# Patient Record
Sex: FEMALE | Race: BLACK OR AFRICAN AMERICAN | NOT HISPANIC OR LATINO | Employment: FULL TIME | URBAN - METROPOLITAN AREA
[De-identification: names, ages, dates, MRNs, and addresses within clinical notes are randomized per-mention and may not be internally consistent; named-entity substitution may affect disease eponyms.]

---

## 2017-02-10 ENCOUNTER — HOSPITAL ENCOUNTER (OUTPATIENT)
Dept: INFUSION THERAPY | Facility: HOSPITAL | Age: 27
Discharge: HOME | End: 2017-02-10
Admitting: INTERNAL MEDICINE

## 2017-02-10 VITALS
TEMPERATURE: 98.7 F | HEART RATE: 97 BPM | RESPIRATION RATE: 16 BRPM | OXYGEN SATURATION: 99 % | DIASTOLIC BLOOD PRESSURE: 90 MMHG | SYSTOLIC BLOOD PRESSURE: 137 MMHG

## 2017-02-10 DIAGNOSIS — K50.00 CROHN'S DISEASE OF SMALL INTESTINE WITHOUT COMPLICATION (HCC): ICD-10-CM

## 2017-02-10 PROCEDURE — 25010000002 VEDOLIZUMAB 300 MG RECONSTITUTED SOLUTION 300 MG VIAL: Performed by: INTERNAL MEDICINE

## 2017-02-10 PROCEDURE — 96365 THER/PROPH/DIAG IV INF INIT: CPT

## 2017-02-10 PROCEDURE — 96413 CHEMO IV INFUSION 1 HR: CPT

## 2017-02-10 RX ADMIN — VEDOLIZUMAB 300 MG: 300 INJECTION, POWDER, LYOPHILIZED, FOR SOLUTION INTRAVENOUS at 12:06

## 2017-02-10 NOTE — PROGRESS NOTES
TOLERATED TREATMENT WELL. VSS. PO FLUIDS AND LUNCH GIVEN, STATES HAS HAD POOR APPETITE. HELD X 30 MINUTES POST INFUSION OBSERVATION. DC'D HOME AMB

## 2017-04-07 ENCOUNTER — HOSPITAL ENCOUNTER (OUTPATIENT)
Dept: INFUSION THERAPY | Facility: HOSPITAL | Age: 27
Discharge: HOME | End: 2017-04-07
Admitting: INTERNAL MEDICINE

## 2017-04-07 VITALS
DIASTOLIC BLOOD PRESSURE: 87 MMHG | RESPIRATION RATE: 20 BRPM | SYSTOLIC BLOOD PRESSURE: 120 MMHG | OXYGEN SATURATION: 98 % | WEIGHT: 140 LBS | BODY MASS INDEX: 26.45 KG/M2 | TEMPERATURE: 98.8 F | HEART RATE: 91 BPM

## 2017-04-07 DIAGNOSIS — K50.019 CROHN'S DISEASE OF SMALL INTESTINE WITH COMPLICATION (HCC): ICD-10-CM

## 2017-04-07 PROCEDURE — 96365 THER/PROPH/DIAG IV INF INIT: CPT

## 2017-04-07 PROCEDURE — 96413 CHEMO IV INFUSION 1 HR: CPT

## 2017-04-07 PROCEDURE — 25010000002 VEDOLIZUMAB 300 MG RECONSTITUTED SOLUTION 300 MG VIAL: Performed by: FAMILY MEDICINE

## 2017-04-07 RX ADMIN — VEDOLIZUMAB 300 MG: 300 INJECTION, POWDER, LYOPHILIZED, FOR SOLUTION INTRAVENOUS at 11:39

## 2017-06-02 ENCOUNTER — HOSPITAL ENCOUNTER (OUTPATIENT)
Dept: INFUSION THERAPY | Facility: HOSPITAL | Age: 27
Discharge: HOME | End: 2017-06-02
Admitting: INTERNAL MEDICINE

## 2017-06-02 VITALS
OXYGEN SATURATION: 95 % | RESPIRATION RATE: 16 BRPM | BODY MASS INDEX: 27.38 KG/M2 | SYSTOLIC BLOOD PRESSURE: 120 MMHG | TEMPERATURE: 99 F | DIASTOLIC BLOOD PRESSURE: 77 MMHG | HEIGHT: 61 IN | HEART RATE: 94 BPM | WEIGHT: 145 LBS

## 2017-06-02 DIAGNOSIS — K50.019 CROHN'S DISEASE OF SMALL INTESTINE WITH COMPLICATION (HCC): ICD-10-CM

## 2017-06-02 PROCEDURE — 96365 THER/PROPH/DIAG IV INF INIT: CPT

## 2017-06-02 PROCEDURE — 96413 CHEMO IV INFUSION 1 HR: CPT

## 2017-06-02 PROCEDURE — 25010000002 VEDOLIZUMAB 300 MG RECONSTITUTED SOLUTION 300 MG VIAL: Performed by: FAMILY MEDICINE

## 2017-06-02 RX ADMIN — VEDOLIZUMAB 300 MG: 300 INJECTION, POWDER, LYOPHILIZED, FOR SOLUTION INTRAVENOUS at 11:53

## 2017-06-02 NOTE — PROGRESS NOTES
Tolerated infusion well with no complaints. No S/S reaction noted. Patient ambulatory, D/C'd from [facility]U at 1328.

## 2017-07-28 ENCOUNTER — HOSPITAL ENCOUNTER (OUTPATIENT)
Dept: INFUSION THERAPY | Facility: HOSPITAL | Age: 27
Discharge: HOME | End: 2017-07-28
Admitting: INTERNAL MEDICINE

## 2017-07-28 VITALS
SYSTOLIC BLOOD PRESSURE: 133 MMHG | HEART RATE: 96 BPM | DIASTOLIC BLOOD PRESSURE: 75 MMHG | TEMPERATURE: 98.8 F | RESPIRATION RATE: 16 BRPM | OXYGEN SATURATION: 99 %

## 2017-07-28 DIAGNOSIS — K50.019 CROHN'S DISEASE OF SMALL INTESTINE WITH COMPLICATION (HCC): ICD-10-CM

## 2017-07-28 PROCEDURE — 96365 THER/PROPH/DIAG IV INF INIT: CPT

## 2017-07-28 PROCEDURE — 25010000002 VEDOLIZUMAB 300 MG RECONSTITUTED SOLUTION 300 MG VIAL: Performed by: FAMILY MEDICINE

## 2017-07-28 RX ADMIN — VEDOLIZUMAB 300 MG: 300 INJECTION, POWDER, LYOPHILIZED, FOR SOLUTION INTRAVENOUS at 12:05

## 2017-09-22 ENCOUNTER — HOSPITAL ENCOUNTER (OUTPATIENT)
Dept: INFUSION THERAPY | Facility: HOSPITAL | Age: 27
Discharge: HOME | End: 2017-09-22
Admitting: NURSE PRACTITIONER

## 2017-09-22 VITALS
WEIGHT: 153 LBS | BODY MASS INDEX: 28.89 KG/M2 | DIASTOLIC BLOOD PRESSURE: 74 MMHG | SYSTOLIC BLOOD PRESSURE: 121 MMHG | HEIGHT: 61 IN | RESPIRATION RATE: 16 BRPM | HEART RATE: 90 BPM | TEMPERATURE: 98.8 F | OXYGEN SATURATION: 99 %

## 2017-09-22 DIAGNOSIS — K50.019 CROHN'S DISEASE OF SMALL INTESTINE WITH COMPLICATION (HCC): ICD-10-CM

## 2017-09-22 PROCEDURE — 96365 THER/PROPH/DIAG IV INF INIT: CPT

## 2017-09-22 PROCEDURE — 25010000002 VEDOLIZUMAB 300 MG RECONSTITUTED SOLUTION 300 MG VIAL: Performed by: FAMILY MEDICINE

## 2017-09-22 RX ADMIN — VEDOLIZUMAB 300 MG: 300 INJECTION, POWDER, LYOPHILIZED, FOR SOLUTION INTRAVENOUS at 11:42

## 2017-09-22 NOTE — PROGRESS NOTES
Tolerated infusion with no problems or S/S of reaction.  Monitored for 30 minutes post infusion.  Pt D/C per ambulation @ 1248.

## 2017-11-17 ENCOUNTER — HOSPITAL ENCOUNTER (OUTPATIENT)
Dept: INFUSION THERAPY | Facility: HOSPITAL | Age: 27
Discharge: HOME | End: 2017-11-17
Admitting: NURSE PRACTITIONER

## 2017-11-17 VITALS
TEMPERATURE: 98.1 F | RESPIRATION RATE: 16 BRPM | OXYGEN SATURATION: 99 % | SYSTOLIC BLOOD PRESSURE: 121 MMHG | HEART RATE: 87 BPM | DIASTOLIC BLOOD PRESSURE: 87 MMHG

## 2017-11-17 DIAGNOSIS — K50.019 CROHN'S DISEASE OF SMALL INTESTINE WITH COMPLICATION (HCC): ICD-10-CM

## 2017-11-17 PROCEDURE — 25010000002 VEDOLIZUMAB 300 MG RECONSTITUTED SOLUTION 300 MG VIAL: Performed by: FAMILY MEDICINE

## 2017-11-17 PROCEDURE — 96365 THER/PROPH/DIAG IV INF INIT: CPT

## 2017-11-17 RX ADMIN — VEDOLIZUMAB 300 MG: 300 INJECTION, POWDER, LYOPHILIZED, FOR SOLUTION INTRAVENOUS at 12:12

## 2018-01-12 ENCOUNTER — HOSPITAL ENCOUNTER (OUTPATIENT)
Dept: INFUSION THERAPY | Facility: HOSPITAL | Age: 28
Discharge: HOME | End: 2018-01-12
Admitting: NURSE PRACTITIONER

## 2018-01-12 VITALS
DIASTOLIC BLOOD PRESSURE: 88 MMHG | HEART RATE: 101 BPM | WEIGHT: 160 LBS | TEMPERATURE: 98.7 F | SYSTOLIC BLOOD PRESSURE: 130 MMHG | BODY MASS INDEX: 30.23 KG/M2 | OXYGEN SATURATION: 97 % | RESPIRATION RATE: 16 BRPM

## 2018-01-12 DIAGNOSIS — K50.019 CROHN'S DISEASE OF SMALL INTESTINE WITH COMPLICATION (HCC): ICD-10-CM

## 2018-01-12 PROCEDURE — 96365 THER/PROPH/DIAG IV INF INIT: CPT

## 2018-01-12 PROCEDURE — 25010000002 VEDOLIZUMAB 300 MG RECONSTITUTED SOLUTION 300 MG VIAL: Performed by: FAMILY MEDICINE

## 2018-01-12 RX ORDER — LEVETIRACETAM 250 MG/1
250 TABLET ORAL 3 TIMES DAILY PRN
COMMUNITY
End: 2019-04-12

## 2018-01-12 RX ADMIN — VEDOLIZUMAB 300 MG: 300 INJECTION, POWDER, LYOPHILIZED, FOR SOLUTION INTRAVENOUS at 11:51

## 2018-01-12 NOTE — PROGRESS NOTES
Tolerated infusion with no problems or S/S of reaction.  Monitored for 30 minutes post infusion.  Pt D/C per ambulation @ 1305.

## 2018-03-08 ENCOUNTER — HOSPITAL ENCOUNTER (OUTPATIENT)
Dept: INFUSION THERAPY | Facility: HOSPITAL | Age: 28
Discharge: HOME | End: 2018-03-08
Admitting: NURSE PRACTITIONER

## 2018-03-08 VITALS
BODY MASS INDEX: 30.23 KG/M2 | OXYGEN SATURATION: 100 % | HEART RATE: 114 BPM | RESPIRATION RATE: 16 BRPM | DIASTOLIC BLOOD PRESSURE: 80 MMHG | WEIGHT: 160 LBS | TEMPERATURE: 99.6 F | SYSTOLIC BLOOD PRESSURE: 120 MMHG

## 2018-03-08 DIAGNOSIS — K50.019 CROHN'S DISEASE OF SMALL INTESTINE WITH COMPLICATION (HCC): ICD-10-CM

## 2018-03-08 LAB
ALBUMIN SERPL-MCNC: 4 G/DL (ref 3.5–5.2)
ALBUMIN/GLOB SERPL: 1.2 G/DL
ALP SERPL-CCNC: 73 U/L (ref 39–117)
ALT SERPL W P-5'-P-CCNC: 15 U/L (ref 1–33)
ANION GAP SERPL CALCULATED.3IONS-SCNC: 10.4 MMOL/L
AST SERPL-CCNC: 13 U/L (ref 1–32)
BILIRUB SERPL-MCNC: 0.3 MG/DL (ref 0.1–1.2)
BUN BLD-MCNC: 8 MG/DL (ref 6–20)
BUN/CREAT SERPL: 11 (ref 7–25)
CALCIUM SPEC-SCNC: 9.3 MG/DL (ref 8.6–10.5)
CHLORIDE SERPL-SCNC: 104 MMOL/L (ref 98–107)
CO2 SERPL-SCNC: 27.6 MMOL/L (ref 22–29)
CREAT BLD-MCNC: 0.73 MG/DL (ref 0.57–1)
DEPRECATED RDW RBC AUTO: 48.7 FL (ref 37–54)
ERYTHROCYTE [DISTWIDTH] IN BLOOD BY AUTOMATED COUNT: 13.7 % (ref 11.7–13)
GFR SERPL CREATININE-BSD FRML MDRD: 114 ML/MIN/1.73
GLOBULIN UR ELPH-MCNC: 3.3 GM/DL
GLUCOSE BLD-MCNC: 78 MG/DL (ref 65–99)
HCT VFR BLD AUTO: 40.6 % (ref 35.6–45.5)
HGB BLD-MCNC: 13.6 G/DL (ref 11.9–15.5)
MCH RBC QN AUTO: 32.7 PG (ref 26.9–32)
MCHC RBC AUTO-ENTMCNC: 33.5 G/DL (ref 32.4–36.3)
MCV RBC AUTO: 97.6 FL (ref 80.5–98.2)
PLATELET # BLD AUTO: 347 10*3/MM3 (ref 140–500)
PMV BLD AUTO: 9.3 FL (ref 6–12)
POTASSIUM BLD-SCNC: 4.4 MMOL/L (ref 3.5–5.2)
PROT SERPL-MCNC: 7.3 G/DL (ref 6–8.5)
RBC # BLD AUTO: 4.16 10*6/MM3 (ref 3.9–5.2)
SODIUM BLD-SCNC: 142 MMOL/L (ref 136–145)
WBC NRBC COR # BLD: 12.5 10*3/MM3 (ref 4.5–10.7)

## 2018-03-08 PROCEDURE — G0463 HOSPITAL OUTPT CLINIC VISIT: HCPCS

## 2018-03-08 PROCEDURE — 36415 COLL VENOUS BLD VENIPUNCTURE: CPT

## 2018-03-08 PROCEDURE — 85027 COMPLETE CBC AUTOMATED: CPT | Performed by: NURSE PRACTITIONER

## 2018-03-08 PROCEDURE — 80053 COMPREHEN METABOLIC PANEL: CPT | Performed by: NURSE PRACTITIONER

## 2018-03-08 RX ORDER — CLONAZEPAM 0.5 MG/1
0.5 TABLET ORAL 2 TIMES DAILY PRN
COMMUNITY
End: 2019-04-12

## 2018-03-08 NOTE — PROGRESS NOTES
Fellow RN started patients IV and vlad patients labs due to present RN busy with others. Upon current RN entering room and investigating health status, patient informed RN that she has not been feeling well but father recently passed away with  scheduled for this weekend. Patient states thinks stress is responsible for health status. However, chart further reviewed and patient with low grade fever, congestion and N/V. States no vomitting last night but positive vomiting 2 nights ago, vomiting only at night time per patient. RN called Dr. El's office, spoke with medical assistant Srinivas who spoke with MD.  Dr. El wants Entyv re-scheduled once patient fever-free x 48 hours. Patient ambulatory, D/C'd from [facility]U at 1131.

## 2018-03-08 NOTE — PATIENT INSTRUCTIONS
Patient to call back tomorrow to re-schedule appointment for Entyvio infusion after she receives her work schedule. Entyvio needs to be re-scheduled once fever-free for 48 hours per Dr. El.

## 2018-03-09 ENCOUNTER — HOSPITAL ENCOUNTER (OUTPATIENT)
Dept: INFUSION THERAPY | Facility: HOSPITAL | Age: 28
Discharge: HOME | End: 2018-03-09

## 2018-03-14 ENCOUNTER — HOSPITAL ENCOUNTER (OUTPATIENT)
Dept: INFUSION THERAPY | Facility: HOSPITAL | Age: 28
Discharge: HOME | End: 2018-03-14
Admitting: NURSE PRACTITIONER

## 2018-03-14 VITALS
HEART RATE: 97 BPM | WEIGHT: 160 LBS | SYSTOLIC BLOOD PRESSURE: 126 MMHG | RESPIRATION RATE: 16 BRPM | BODY MASS INDEX: 30.23 KG/M2 | OXYGEN SATURATION: 95 % | TEMPERATURE: 99.4 F | DIASTOLIC BLOOD PRESSURE: 87 MMHG

## 2018-03-14 DIAGNOSIS — K50.019 CROHN'S DISEASE OF SMALL INTESTINE WITH COMPLICATION (HCC): ICD-10-CM

## 2018-03-14 PROCEDURE — 25010000002 VEDOLIZUMAB 300 MG RECONSTITUTED SOLUTION 300 MG VIAL: Performed by: FAMILY MEDICINE

## 2018-03-14 PROCEDURE — 96365 THER/PROPH/DIAG IV INF INIT: CPT

## 2018-03-14 RX ORDER — ESOMEPRAZOLE MAGNESIUM 40 MG/1
40 CAPSULE, DELAYED RELEASE ORAL
COMMUNITY
End: 2019-04-12

## 2018-03-14 RX ORDER — GUAIFENESIN AND CODEINE PHOSPHATE 100; 10 MG/5ML; MG/5ML
10 SOLUTION ORAL 4 TIMES DAILY PRN
COMMUNITY

## 2018-03-14 RX ADMIN — VEDOLIZUMAB 300 MG: 300 INJECTION, POWDER, LYOPHILIZED, FOR SOLUTION INTRAVENOUS at 10:38

## 2018-03-14 NOTE — PROGRESS NOTES
MONICA Espinoza notified at 1000 regarding patient's increase in temperature.  See orders.  Advised to proceed with Enyvio infusion today.  Advised patient to follow-up at the office if any S/S of infection or difficulties after today's infusion.

## 2018-03-14 NOTE — PROGRESS NOTES
Pt tolerated infusion with no S/S of reaction or patient complaints.  Pt D/C per ambulation @ 1150.

## 2018-05-09 ENCOUNTER — HOSPITAL ENCOUNTER (OUTPATIENT)
Dept: INFUSION THERAPY | Facility: HOSPITAL | Age: 28
Discharge: HOME | End: 2018-05-09
Admitting: NURSE PRACTITIONER

## 2018-05-09 VITALS
TEMPERATURE: 87 F | OXYGEN SATURATION: 98 % | DIASTOLIC BLOOD PRESSURE: 85 MMHG | RESPIRATION RATE: 18 BRPM | HEART RATE: 88 BPM | SYSTOLIC BLOOD PRESSURE: 121 MMHG

## 2018-05-09 DIAGNOSIS — K50.019 CROHN'S DISEASE OF SMALL INTESTINE WITH COMPLICATION (HCC): ICD-10-CM

## 2018-05-09 LAB
ALBUMIN SERPL-MCNC: 3.9 G/DL (ref 3.5–5.2)
ALBUMIN/GLOB SERPL: 1.3 G/DL
ALP SERPL-CCNC: 60 U/L (ref 39–117)
ALT SERPL W P-5'-P-CCNC: 10 U/L (ref 1–33)
ANION GAP SERPL CALCULATED.3IONS-SCNC: 9.6 MMOL/L
AST SERPL-CCNC: 12 U/L (ref 1–32)
BILIRUB SERPL-MCNC: <0.2 MG/DL (ref 0.1–1.2)
BUN BLD-MCNC: 8 MG/DL (ref 6–20)
BUN/CREAT SERPL: 11 (ref 7–25)
CALCIUM SPEC-SCNC: 9.1 MG/DL (ref 8.6–10.5)
CHLORIDE SERPL-SCNC: 106 MMOL/L (ref 98–107)
CO2 SERPL-SCNC: 23.4 MMOL/L (ref 22–29)
CREAT BLD-MCNC: 0.73 MG/DL (ref 0.57–1)
DEPRECATED RDW RBC AUTO: 47.5 FL (ref 37–54)
ERYTHROCYTE [DISTWIDTH] IN BLOOD BY AUTOMATED COUNT: 13.1 % (ref 11.7–13)
GFR SERPL CREATININE-BSD FRML MDRD: 114 ML/MIN/1.73
GLOBULIN UR ELPH-MCNC: 3 GM/DL
GLUCOSE BLD-MCNC: 111 MG/DL (ref 65–99)
HCT VFR BLD AUTO: 40 % (ref 35.6–45.5)
HGB BLD-MCNC: 13 G/DL (ref 11.9–15.5)
MCH RBC QN AUTO: 32.3 PG (ref 26.9–32)
MCHC RBC AUTO-ENTMCNC: 32.5 G/DL (ref 32.4–36.3)
MCV RBC AUTO: 99.3 FL (ref 80.5–98.2)
PLATELET # BLD AUTO: 304 10*3/MM3 (ref 140–500)
PMV BLD AUTO: 9.7 FL (ref 6–12)
POTASSIUM BLD-SCNC: 4.1 MMOL/L (ref 3.5–5.2)
PROT SERPL-MCNC: 6.9 G/DL (ref 6–8.5)
RBC # BLD AUTO: 4.03 10*6/MM3 (ref 3.9–5.2)
SODIUM BLD-SCNC: 139 MMOL/L (ref 136–145)
WBC NRBC COR # BLD: 9.28 10*3/MM3 (ref 4.5–10.7)

## 2018-05-09 PROCEDURE — 85027 COMPLETE CBC AUTOMATED: CPT | Performed by: FAMILY MEDICINE

## 2018-05-09 PROCEDURE — 96365 THER/PROPH/DIAG IV INF INIT: CPT

## 2018-05-09 PROCEDURE — 80053 COMPREHEN METABOLIC PANEL: CPT | Performed by: FAMILY MEDICINE

## 2018-05-09 PROCEDURE — 25010000002 VEDOLIZUMAB 300 MG RECONSTITUTED SOLUTION 300 MG VIAL: Performed by: FAMILY MEDICINE

## 2018-05-09 RX ADMIN — VEDOLIZUMAB 300 MG: 300 INJECTION, POWDER, LYOPHILIZED, FOR SOLUTION INTRAVENOUS at 10:58

## 2018-07-06 ENCOUNTER — INFUSION (OUTPATIENT)
Dept: ONCOLOGY | Facility: HOSPITAL | Age: 28
End: 2018-07-06

## 2018-07-06 VITALS
OXYGEN SATURATION: 99 % | DIASTOLIC BLOOD PRESSURE: 76 MMHG | TEMPERATURE: 98.5 F | HEART RATE: 94 BPM | SYSTOLIC BLOOD PRESSURE: 112 MMHG | BODY MASS INDEX: 30.23 KG/M2 | WEIGHT: 160 LBS

## 2018-07-06 DIAGNOSIS — K50.019 CROHN'S DISEASE OF SMALL INTESTINE WITH COMPLICATION (HCC): Primary | ICD-10-CM

## 2018-07-06 PROCEDURE — 96365 THER/PROPH/DIAG IV INF INIT: CPT | Performed by: NURSE PRACTITIONER

## 2018-07-06 PROCEDURE — 25010000002 VEDOLIZUMAB 300 MG RECONSTITUTED SOLUTION 300 MG VIAL: Performed by: FAMILY MEDICINE

## 2018-07-06 RX ADMIN — VEDOLIZUMAB 300 MG: 300 INJECTION, POWDER, LYOPHILIZED, FOR SOLUTION INTRAVENOUS at 11:15

## 2018-08-16 ENCOUNTER — TRANSCRIBE ORDERS (OUTPATIENT)
Dept: ADMINISTRATIVE | Facility: HOSPITAL | Age: 28
End: 2018-08-16

## 2018-08-16 ENCOUNTER — LAB (OUTPATIENT)
Dept: LAB | Facility: HOSPITAL | Age: 28
End: 2018-08-16

## 2018-08-16 DIAGNOSIS — M25.50 PAIN IN JOINT, MULTIPLE SITES: Primary | ICD-10-CM

## 2018-08-16 DIAGNOSIS — K50.90 CROHN'S DISEASE WITHOUT COMPLICATION, UNSPECIFIED GASTROINTESTINAL TRACT LOCATION (HCC): ICD-10-CM

## 2018-08-16 DIAGNOSIS — M25.50 PAIN IN JOINT, MULTIPLE SITES: ICD-10-CM

## 2018-08-16 PROCEDURE — 85598 HEXAGNAL PHOSPH PLTLT NEUTRL: CPT

## 2018-08-16 PROCEDURE — 86140 C-REACTIVE PROTEIN: CPT

## 2018-08-16 PROCEDURE — 86146 BETA-2 GLYCOPROTEIN ANTIBODY: CPT

## 2018-08-16 PROCEDURE — 85610 PROTHROMBIN TIME: CPT

## 2018-08-16 PROCEDURE — 85732 THROMBOPLASTIN TIME PARTIAL: CPT

## 2018-08-16 PROCEDURE — 86147 CARDIOLIPIN ANTIBODY EA IG: CPT

## 2018-08-16 PROCEDURE — 85730 THROMBOPLASTIN TIME PARTIAL: CPT

## 2018-08-16 PROCEDURE — 85597 PHOSPHOLIPID PLTLT NEUTRALIZ: CPT

## 2018-08-16 PROCEDURE — 86431 RHEUMATOID FACTOR QUANT: CPT

## 2018-08-16 PROCEDURE — 85670 THROMBIN TIME PLASMA: CPT

## 2018-08-16 PROCEDURE — 86200 CCP ANTIBODY: CPT

## 2018-08-16 PROCEDURE — 85613 RUSSELL VIPER VENOM DILUTED: CPT

## 2018-08-16 PROCEDURE — 85652 RBC SED RATE AUTOMATED: CPT

## 2018-08-16 PROCEDURE — 36415 COLL VENOUS BLD VENIPUNCTURE: CPT

## 2018-08-18 LAB
CCP IGA+IGG SERPL IA-ACNC: 12 UNITS (ref 0–19)
CRP SERPL-MCNC: 3.2 MG/L (ref 0–4.9)
ERYTHROCYTE [SEDIMENTATION RATE] IN BLOOD BY WESTERGREN METHOD: 11 MM/HR (ref 0–32)
RA LATEX TURBID: <10 IU/ML (ref 0–13.9)

## 2018-08-22 LAB
APTT HEX PL PPP: 7 SEC
APTT IMM NP PPP: NORMAL SEC
APTT PPP 1:1 SALINE: NORMAL SEC
APTT PPP: 24.7 SEC
B2 GLYCOPROT1 IGA SER-ACNC: <10 SAU
B2 GLYCOPROT1 IGG SER-ACNC: <10 SGU
B2 GLYCOPROT1 IGM SER-ACNC: <10 SMU
CARDIOLIPIN IGG SER IA-ACNC: <10 GPL
CARDIOLIPIN IGM SER IA-ACNC: <10 MPL
CONFIRM DRVVT: NORMAL SEC
INR PPP: 0.9 RATIO
LAC INTERPRETATION: NORMAL
PLATELET NEUTRALIZATION: 0 SEC
PROTHROMBIN TIME: 9.8 SEC
SCREEN DRVVT/NORMAL: NORMAL RATIO
SCREEN DRVVT: 28.6 SEC
THROMBIN TIME: 17.2 SEC

## 2018-08-31 ENCOUNTER — INFUSION (OUTPATIENT)
Dept: ONCOLOGY | Facility: HOSPITAL | Age: 28
End: 2018-08-31

## 2018-08-31 VITALS
DIASTOLIC BLOOD PRESSURE: 88 MMHG | TEMPERATURE: 98.7 F | WEIGHT: 161.8 LBS | HEART RATE: 103 BPM | BODY MASS INDEX: 30.57 KG/M2 | OXYGEN SATURATION: 100 % | SYSTOLIC BLOOD PRESSURE: 133 MMHG

## 2018-08-31 DIAGNOSIS — K50.019 CROHN'S DISEASE OF SMALL INTESTINE WITH COMPLICATION (HCC): Primary | ICD-10-CM

## 2018-08-31 PROCEDURE — 25010000002 VEDOLIZUMAB 300 MG RECONSTITUTED SOLUTION 300 MG VIAL: Performed by: FAMILY MEDICINE

## 2018-08-31 PROCEDURE — 96365 THER/PROPH/DIAG IV INF INIT: CPT

## 2018-08-31 RX ADMIN — VEDOLIZUMAB 300 MG: 300 INJECTION, POWDER, LYOPHILIZED, FOR SOLUTION INTRAVENOUS at 11:30

## 2018-10-26 ENCOUNTER — INFUSION (OUTPATIENT)
Dept: ONCOLOGY | Facility: HOSPITAL | Age: 28
End: 2018-10-26

## 2018-10-26 VITALS
TEMPERATURE: 99.7 F | OXYGEN SATURATION: 99 % | BODY MASS INDEX: 30.31 KG/M2 | HEART RATE: 102 BPM | DIASTOLIC BLOOD PRESSURE: 88 MMHG | SYSTOLIC BLOOD PRESSURE: 142 MMHG | WEIGHT: 160.4 LBS

## 2018-10-26 DIAGNOSIS — K50.019 CROHN'S DISEASE OF SMALL INTESTINE WITH COMPLICATION (HCC): Primary | ICD-10-CM

## 2018-10-26 PROCEDURE — 25010000002 VEDOLIZUMAB 300 MG RECONSTITUTED SOLUTION 300 MG VIAL: Performed by: FAMILY MEDICINE

## 2018-10-26 PROCEDURE — 96365 THER/PROPH/DIAG IV INF INIT: CPT | Performed by: NURSE PRACTITIONER

## 2018-10-26 RX ADMIN — VEDOLIZUMAB 300 MG: 300 INJECTION, POWDER, LYOPHILIZED, FOR SOLUTION INTRAVENOUS at 12:57

## 2018-12-21 ENCOUNTER — INFUSION (OUTPATIENT)
Dept: ONCOLOGY | Facility: HOSPITAL | Age: 28
End: 2018-12-21

## 2018-12-21 DIAGNOSIS — K50.019 CROHN'S DISEASE OF SMALL INTESTINE WITH COMPLICATION (HCC): Primary | ICD-10-CM

## 2018-12-21 PROCEDURE — 25010000002 VEDOLIZUMAB 300 MG RECONSTITUTED SOLUTION 300 MG VIAL: Performed by: FAMILY MEDICINE

## 2018-12-21 PROCEDURE — 96365 THER/PROPH/DIAG IV INF INIT: CPT | Performed by: NURSE PRACTITIONER

## 2018-12-21 RX ADMIN — VEDOLIZUMAB 300 MG: 300 INJECTION, POWDER, LYOPHILIZED, FOR SOLUTION INTRAVENOUS at 11:06

## 2019-02-15 ENCOUNTER — APPOINTMENT (OUTPATIENT)
Dept: ONCOLOGY | Facility: HOSPITAL | Age: 29
End: 2019-02-15

## 2019-04-03 ENCOUNTER — TRANSCRIBE ORDERS (OUTPATIENT)
Dept: ADMINISTRATIVE | Facility: HOSPITAL | Age: 29
End: 2019-04-03

## 2019-04-03 DIAGNOSIS — K50.919 CROHN'S DISEASE WITH COMPLICATION, UNSPECIFIED GASTROINTESTINAL TRACT LOCATION (HCC): Primary | ICD-10-CM

## 2019-04-12 ENCOUNTER — INFUSION (OUTPATIENT)
Dept: ONCOLOGY | Facility: HOSPITAL | Age: 29
End: 2019-04-12

## 2019-04-12 VITALS
BODY MASS INDEX: 29.93 KG/M2 | WEIGHT: 158.4 LBS | OXYGEN SATURATION: 99 % | DIASTOLIC BLOOD PRESSURE: 87 MMHG | RESPIRATION RATE: 18 BRPM | SYSTOLIC BLOOD PRESSURE: 118 MMHG | HEART RATE: 105 BPM | TEMPERATURE: 99.1 F

## 2019-04-12 DIAGNOSIS — K50.019 CROHN'S DISEASE OF SMALL INTESTINE WITH COMPLICATION (HCC): Primary | ICD-10-CM

## 2019-04-12 PROCEDURE — 96365 THER/PROPH/DIAG IV INF INIT: CPT | Performed by: NURSE PRACTITIONER

## 2019-04-12 PROCEDURE — 25010000002 VEDOLIZUMAB 300 MG RECONSTITUTED SOLUTION 300 MG VIAL: Performed by: FAMILY MEDICINE

## 2019-04-12 RX ORDER — HYDROXYZINE HYDROCHLORIDE 10 MG/1
10 TABLET, FILM COATED ORAL EVERY 8 HOURS PRN
COMMUNITY

## 2019-04-12 RX ADMIN — VEDOLIZUMAB 300 MG: 300 INJECTION, POWDER, LYOPHILIZED, FOR SOLUTION INTRAVENOUS at 11:13

## 2019-06-07 ENCOUNTER — INFUSION (OUTPATIENT)
Dept: ONCOLOGY | Facility: HOSPITAL | Age: 29
End: 2019-06-07

## 2019-06-07 VITALS
WEIGHT: 159.4 LBS | OXYGEN SATURATION: 100 % | BODY MASS INDEX: 30.12 KG/M2 | DIASTOLIC BLOOD PRESSURE: 94 MMHG | SYSTOLIC BLOOD PRESSURE: 136 MMHG | TEMPERATURE: 99.5 F | HEART RATE: 116 BPM | RESPIRATION RATE: 16 BRPM

## 2019-06-07 DIAGNOSIS — K50.019 CROHN'S DISEASE OF SMALL INTESTINE WITH COMPLICATION (HCC): Primary | ICD-10-CM

## 2019-06-07 PROCEDURE — 96365 THER/PROPH/DIAG IV INF INIT: CPT | Performed by: NURSE PRACTITIONER

## 2019-06-07 PROCEDURE — 25010000002 VEDOLIZUMAB 300 MG RECONSTITUTED SOLUTION 300 MG VIAL: Performed by: FAMILY MEDICINE

## 2019-06-07 RX ORDER — BUPROPION HYDROCHLORIDE 150 MG/1
150 TABLET ORAL DAILY
COMMUNITY
Start: 2019-05-22 | End: 2020-05-21

## 2019-06-07 RX ADMIN — VEDOLIZUMAB 300 MG: 300 INJECTION, POWDER, LYOPHILIZED, FOR SOLUTION INTRAVENOUS at 11:48

## 2019-08-02 ENCOUNTER — INFUSION (OUTPATIENT)
Dept: ONCOLOGY | Facility: HOSPITAL | Age: 29
End: 2019-08-02

## 2019-08-02 VITALS
BODY MASS INDEX: 29.29 KG/M2 | DIASTOLIC BLOOD PRESSURE: 85 MMHG | HEART RATE: 97 BPM | SYSTOLIC BLOOD PRESSURE: 124 MMHG | RESPIRATION RATE: 16 BRPM | TEMPERATURE: 98.8 F | WEIGHT: 155 LBS | OXYGEN SATURATION: 97 %

## 2019-08-02 DIAGNOSIS — K50.019 CROHN'S DISEASE OF SMALL INTESTINE WITH COMPLICATION (HCC): Primary | ICD-10-CM

## 2019-08-02 PROCEDURE — 25010000002 VEDOLIZUMAB 300 MG RECONSTITUTED SOLUTION 300 MG VIAL: Performed by: FAMILY MEDICINE

## 2019-08-02 PROCEDURE — 96365 THER/PROPH/DIAG IV INF INIT: CPT | Performed by: NURSE PRACTITIONER

## 2019-08-02 RX ADMIN — VEDOLIZUMAB 300 MG: 300 INJECTION, POWDER, LYOPHILIZED, FOR SOLUTION INTRAVENOUS at 11:46
